# Patient Record
Sex: FEMALE | URBAN - METROPOLITAN AREA
[De-identification: names, ages, dates, MRNs, and addresses within clinical notes are randomized per-mention and may not be internally consistent; named-entity substitution may affect disease eponyms.]

---

## 2024-01-16 ENCOUNTER — LAB OUTSIDE AN ENCOUNTER (OUTPATIENT)
Dept: URBAN - METROPOLITAN AREA CLINIC 86 | Facility: CLINIC | Age: 54
End: 2024-01-16

## 2024-01-16 ENCOUNTER — OFFICE VISIT (OUTPATIENT)
Dept: URBAN - METROPOLITAN AREA CLINIC 86 | Facility: CLINIC | Age: 54
End: 2024-01-16
Payer: COMMERCIAL

## 2024-01-16 VITALS
BODY MASS INDEX: 23.95 KG/M2 | SYSTOLIC BLOOD PRESSURE: 123 MMHG | HEIGHT: 66 IN | HEART RATE: 88 BPM | WEIGHT: 149 LBS | DIASTOLIC BLOOD PRESSURE: 86 MMHG | TEMPERATURE: 97.3 F

## 2024-01-16 DIAGNOSIS — Z79.899 HIGH RISK MEDICATION USE: ICD-10-CM

## 2024-01-16 DIAGNOSIS — R76.8 HEPATITIS C ANTIBODY TEST POSITIVE: ICD-10-CM

## 2024-01-16 DIAGNOSIS — R74.8 ELEVATED LIVER ENZYMES: ICD-10-CM

## 2024-01-16 DIAGNOSIS — Z71.85 VACCINE COUNSELING: ICD-10-CM

## 2024-01-16 PROBLEM — 453861000124107: Status: ACTIVE | Noted: 2024-01-16

## 2024-01-16 PROBLEM — 314706002: Status: ACTIVE | Noted: 2024-01-16

## 2024-01-16 PROBLEM — 443913008: Status: ACTIVE | Noted: 2024-01-16

## 2024-01-16 PROCEDURE — 99214 OFFICE O/P EST MOD 30 MIN: CPT | Performed by: PHYSICIAN ASSISTANT

## 2024-01-16 RX ORDER — AMITRIPTYLINE HYDROCHLORIDE 10 MG/1
1 TABLET AT BEDTIME TABLET, FILM COATED ORAL ONCE A DAY
Status: ON HOLD | COMMUNITY

## 2024-01-16 RX ORDER — ROSUVASTATIN CALCIUM 20 MG/1
1 TABLET TABLET, FILM COATED ORAL ONCE A DAY
Status: ACTIVE | COMMUNITY

## 2024-01-16 RX ORDER — LACOSAMIDE 50 MG/1
2 TABLETS TABLET, FILM COATED ORAL TWICE A DAY
Status: ACTIVE | COMMUNITY

## 2024-01-16 NOTE — HPI-TODAY'S VISIT:
This is a 53-year-old female referred by CORKY Borrego, for evaluation of hepatitis C.  Past medical history includes fibromyalgia, pseudoseizures, NICOLE positive, osteoarthritis of knees, hyperlipidemia, acute stress reaction She is listed as taking Crestor 20 mg, amitriptyline 10 mg.  12/21/2023 labs with glucose 92, creatinine 0.73, sodium 142, potassium 4.5, bilirubin 0.3, alkaline phosphatase 116, AST 20, ALT 18.Hepatitis C antibody reactive, after hepatitis B surface antigen negative, hepatitis B core negative, hepatitis A IgM negative.  Alkaline phosphatase fractionation showed that 59% from liver, 41% from bone  Discussed transmision and denies rf. Denies hx iv drug use/tatoos/ tranfusion hx.  ETOH use prior but not in past few months Discussed could be false positive and need to recheck

## 2024-01-20 LAB
A/G RATIO: 1.6
ABSOLUTE BASOPHILS: 10
ABSOLUTE EOSINOPHILS: 82
ABSOLUTE LYMPHOCYTES: 989
ABSOLUTE MONOCYTES: 311
ABSOLUTE NEUTROPHILS: 3708
ALBUMIN: 4.5
ALKALINE PHOSPHATASE: 84
ALT (SGPT): 11
AST (SGOT): 14
BASOPHILS: 0.2
BILIRUBIN, TOTAL: 0.4
BUN/CREATININE RATIO: (no result)
BUN: 13
CALCIUM: 9.7
CARBON DIOXIDE, TOTAL: 28
CHLORIDE: 105
CREATININE: 0.81
EGFR: 86
EOSINOPHILS: 1.6
GLOBULIN, TOTAL: 2.8
GLUCOSE: 100
HCV RNA, QUANTITATIVE REAL TIME PCR: <1.18
HCV RNA, QUANTITATIVE REAL TIME PCR: <15
HEMATOCRIT: 42.1
HEMOGLOBIN: 13.8
HEPATITIS A AB, TOTAL: (no result)
HEPATITIS B SURFACE AB IMMUNITY, QN: <5
HEPATITIS C ANTIBODY: REACTIVE
LYMPHOCYTES: 19.4
MCH: 29.6
MCHC: 32.8
MCV: 90.3
MONOCYTES: 6.1
MPV: 10.5
NEUTROPHILS: 72.7
PLATELET COUNT: 279
POTASSIUM: 4.2
PROTEIN, TOTAL: 7.3
RDW: 11.9
RED BLOOD CELL COUNT: 4.66
SODIUM: 141
WHITE BLOOD CELL COUNT: 5.1

## 2024-01-22 ENCOUNTER — TELEPHONE ENCOUNTER (OUTPATIENT)
Dept: URBAN - METROPOLITAN AREA CLINIC 86 | Facility: CLINIC | Age: 54
End: 2024-01-22

## 2024-01-22 NOTE — HPI-TODAY'S VISIT:
Dear Yunior Banks,  The 1/16/24 labs were sent to me.  The hepatitis B immunity was not seen and I would recommend discussing that with your primary care.  Glucose 100 which is borderline for fasting.  Creatinine 0.81, sodium 141, potassium 4.2, bilirubin 0.4, alkaline phosphatase 84, AST 14, ALT 11.  This is normal.  The white blood cells red blood cells, hemoglobin, platelets all normal.  The hepatitis A immunity was not seen and I recommend discussing that with your primary care.  The hepatitis C testing shows that the antibody was again positive but the viral load was not detected.  You do not have any current hepatitis C infection.  I would still recommend getting the ultrasound given the antibody is still positive this likely means that you are exposed at 1 time and your body was able to fight the virus off and you do not currently have it and cannot spread it to anyone. Latasha López PA-C

## 2024-02-01 ENCOUNTER — US W/ DOPP (OUTPATIENT)
Dept: URBAN - METROPOLITAN AREA CLINIC 91 | Facility: CLINIC | Age: 54
End: 2024-02-01
Payer: COMMERCIAL

## 2024-02-01 DIAGNOSIS — R76.8 HEPATITIS C ANTIBODY TEST POSITIVE: ICD-10-CM

## 2024-02-01 PROCEDURE — 76705 ECHO EXAM OF ABDOMEN: CPT

## 2024-02-01 PROCEDURE — 93975 VASCULAR STUDY: CPT

## 2024-02-27 ENCOUNTER — OV EP (OUTPATIENT)
Dept: URBAN - METROPOLITAN AREA CLINIC 86 | Facility: CLINIC | Age: 54
End: 2024-02-27
Payer: COMMERCIAL

## 2024-02-27 VITALS
HEART RATE: 72 BPM | DIASTOLIC BLOOD PRESSURE: 78 MMHG | WEIGHT: 151.5 LBS | SYSTOLIC BLOOD PRESSURE: 122 MMHG | BODY MASS INDEX: 24.35 KG/M2 | HEIGHT: 66 IN | TEMPERATURE: 97 F

## 2024-02-27 DIAGNOSIS — Z71.85 VACCINE COUNSELING: ICD-10-CM

## 2024-02-27 DIAGNOSIS — R74.8 ELEVATED LIVER ENZYMES: ICD-10-CM

## 2024-02-27 DIAGNOSIS — R76.8 HEPATITIS C ANTIBODY TEST POSITIVE: ICD-10-CM

## 2024-02-27 DIAGNOSIS — Z79.899 HIGH RISK MEDICATION USE: ICD-10-CM

## 2024-02-27 PROCEDURE — 99214 OFFICE O/P EST MOD 30 MIN: CPT | Performed by: PHYSICIAN ASSISTANT

## 2024-02-27 RX ORDER — ROSUVASTATIN CALCIUM 20 MG/1
1 TABLET TABLET, FILM COATED ORAL ONCE A DAY
Status: ACTIVE | COMMUNITY

## 2024-02-27 RX ORDER — LACOSAMIDE 50 MG/1
2 TABLETS TABLET, FILM COATED ORAL TWICE A DAY
Status: ACTIVE | COMMUNITY

## 2024-02-27 RX ORDER — AMITRIPTYLINE HYDROCHLORIDE 10 MG/1
1 TABLET AT BEDTIME TABLET, FILM COATED ORAL ONCE A DAY
Status: ON HOLD | COMMUNITY

## 2024-02-27 NOTE — HPI-TODAY'S VISIT:
This is a 53-year-old female referred by CORKY Borrego, for evaluation of hepatitis C.  2/27/24 ov  The 2/1/24 ultrasound was sent to me.  The liver appeared homogeneous in echotexture enhancing lesions.  The hepatic vascular patent.  The common bile duct 3 mm and this is normal.  Gallbladder appeared unremarkable and they did not see any gallstones.  Right kidney appears normal.  Spleen 8.1 cm and this is normal.  Overall they did not see any lesions which is good to note.  We will review this at the follow-up.  The 1/16/24 labs were sent to me. The hepatitis B immunity was not seen and I would recommend discussing that with your primary care. Glucose 100 which is borderline for fasting. Creatinine 0.81, sodium 141, potassium 4.2, bilirubin 0.4, alkaline phosphatase 84, AST 14, ALT 11. This is normal. The white blood cells red blood cells, hemoglobin, platelets all normal. The hepatitis A immunity was not seen and I recommend discussing that with your primary care. The hepatitis C testing shows that the antibody was again positive but the viral load was not detected. You do not have any current hepatitis C infection.    recap Past medical history includes fibromyalgia, pseudoseizures, NICOLE positive, osteoarthritis of knees, hyperlipidemia, acute stress reaction She is listed as taking Crestor 20 mg, amitriptyline 10 mg.  12/21/2023 labs with glucose 92, creatinine 0.73, sodium 142, potassium 4.5, bilirubin 0.3, alkaline phosphatase 116, AST 20, ALT 18.Hepatitis C antibody reactive, after hepatitis B surface antigen negative, hepatitis B core negative, hepatitis A IgM negative.  Alkaline phosphatase fractionation showed that 59% from liver, 41% from bone  Discussed transmision and denies rf. Denies hx iv drug use/tatoos/ tranfusion hx.  ETOH use prior but not in past few months Discussed could be false positive and need to recheck